# Patient Record
Sex: FEMALE
[De-identification: names, ages, dates, MRNs, and addresses within clinical notes are randomized per-mention and may not be internally consistent; named-entity substitution may affect disease eponyms.]

---

## 2021-06-04 ENCOUNTER — TRANSCRIPTION ENCOUNTER (OUTPATIENT)
Age: 36
End: 2021-06-04

## 2021-09-10 ENCOUNTER — TRANSCRIPTION ENCOUNTER (OUTPATIENT)
Age: 36
End: 2021-09-10

## 2022-01-17 ENCOUNTER — TRANSCRIPTION ENCOUNTER (OUTPATIENT)
Age: 37
End: 2022-01-17

## 2022-03-10 ENCOUNTER — TRANSCRIPTION ENCOUNTER (OUTPATIENT)
Age: 37
End: 2022-03-10

## 2022-08-15 ENCOUNTER — NON-APPOINTMENT (OUTPATIENT)
Age: 37
End: 2022-08-15

## 2022-09-18 ENCOUNTER — NON-APPOINTMENT (OUTPATIENT)
Age: 37
End: 2022-09-18

## 2023-03-07 ENCOUNTER — FORM ENCOUNTER (OUTPATIENT)
Age: 38
End: 2023-03-07

## 2023-04-25 ENCOUNTER — NON-APPOINTMENT (OUTPATIENT)
Age: 38
End: 2023-04-25

## 2023-04-25 PROBLEM — Z00.00 ENCOUNTER FOR PREVENTIVE HEALTH EXAMINATION: Status: ACTIVE | Noted: 2023-04-25

## 2023-05-10 ENCOUNTER — APPOINTMENT (OUTPATIENT)
Dept: BREAST CENTER | Facility: CLINIC | Age: 38
End: 2023-05-10
Payer: MEDICAID

## 2023-05-10 VITALS
WEIGHT: 155 LBS | HEART RATE: 55 BPM | SYSTOLIC BLOOD PRESSURE: 98 MMHG | BODY MASS INDEX: 27.46 KG/M2 | OXYGEN SATURATION: 99 % | HEIGHT: 63 IN | DIASTOLIC BLOOD PRESSURE: 63 MMHG

## 2023-05-10 DIAGNOSIS — J45.909 UNSPECIFIED ASTHMA, UNCOMPLICATED: ICD-10-CM

## 2023-05-10 DIAGNOSIS — Z78.9 OTHER SPECIFIED HEALTH STATUS: ICD-10-CM

## 2023-05-10 DIAGNOSIS — F41.9 ANXIETY DISORDER, UNSPECIFIED: ICD-10-CM

## 2023-05-10 DIAGNOSIS — Z22.7 LATENT TUBERCULOSIS: ICD-10-CM

## 2023-05-10 PROCEDURE — 99204 OFFICE O/P NEW MOD 45 MIN: CPT

## 2023-05-10 RX ORDER — CELECOXIB 50 MG/1
CAPSULE ORAL
Refills: 0 | Status: ACTIVE | COMMUNITY

## 2023-05-10 RX ORDER — ISONIAZID 300 MG/1
TABLET ORAL
Refills: 0 | Status: ACTIVE | COMMUNITY

## 2023-05-10 RX ORDER — MOMETASONE FUROATE 50 UG/1
AEROSOL RESPIRATORY (INHALATION)
Refills: 0 | Status: ACTIVE | COMMUNITY

## 2023-05-10 RX ORDER — BUSPIRONE HYDROCHLORIDE 7.5 MG/1
TABLET ORAL
Refills: 0 | Status: ACTIVE | COMMUNITY

## 2023-05-10 NOTE — PHYSICAL EXAM
[No Supraclavicular Adenopathy] : no supraclavicular adenopathy [Asymmetrical] : asymmetrical [No dominant masses] : no dominant masses in right breast  [No dominant masses] : no dominant masses left breast [No Nipple Retraction] : no left nipple retraction [No Nipple Discharge] : no left nipple discharge [No Axillary Lymphadenopathy] : no left axillary lymphadenopathy [No Rashes] : no rashes [No Ulceration] : no ulceration [Supple] : supple [Examined in the supine and seated position] : examined in the supine and seated position

## 2023-05-12 NOTE — PAST MEDICAL HISTORY
[Menarche Age ____] : age at menarche was [unfilled] [Definite ___ (Date)] : the last menstrual period was [unfilled] [Regular Cycle Intervals] : have been regular [Total Preg ___] : G[unfilled] [History of Hormone Replacement Treatment] : has no history of hormone replacement treatment [FreeTextEntry6] : yes - 2 egg retrieval [FreeTextEntry7] : yes [FreeTextEntry8] : n/a

## 2023-05-12 NOTE — CONSULT LETTER
[Dear  ___] : Dear ~HOLLY, [Consult Letter:] : I had the pleasure of evaluating your patient, [unfilled]. [Please see my note below.] : Please see my note below. [Consult Closing:] : Thank you very much for allowing me to participate in the care of this patient.  If you have any questions, please do not hesitate to contact me. [Sincerely,] : Sincerely, [FreeTextEntry2] : Dr. Lozada [FreeTextEntry3] : Jhony\par \par Jhony De La O MD\par Chief of Breast Surgery\par Director of Breast Cancer Program\par [facility]\par \par \par

## 2023-05-12 NOTE — HISTORY OF PRESENT ILLNESS
[FreeTextEntry1] : 39 yo AJ F referred by Dr. Lozada presents for consultation of BIRADS 3 imaging. Patient most recently had callback R dxMMG/US on 3/8/23 showing probably benign right breast asymmetry seen on the baseline MLO and lateral projections without a sonographic correlate. Six-month follow-up diagnostic right mammography recommended. Denies any palpable abnormalities, skin changes, nipple changes, or nipple discharge bilaterally. Denies any history of breast surgeries or biopsies.\par \par Patient [place] age 37. Denies famhx of ovarian cancer. \par NORMA 16.4%

## 2023-05-12 NOTE — ASSESSMENT
[FreeTextEntry1] : 39 yo F referred by Dr. Lozada presents for consultation of BIRADS 3 imaging. Patient most recently had callback R dxMMG/US on 3/8/23 showing probably benign right breast asymmetry seen on the baseline MLO and lateral projections without a sonographic correlate. \par \par Patient without complaint. Physical exam WNL. Plan for diagnostic right mammography and re-examination in Sept 2023. Patient verbalizes understanding and is in agreement with the plan.\par

## 2023-05-12 NOTE — DATA REVIEWED
[FreeTextEntry1] : 2/3/23 B/L sMMG ([facility]): An asymmetry upper far posterior right breast on MLO view only. Recommend diagnostic right mammogram with 90 degree lateral view, XCCL view and spot compression, attention to the far posterior deep tissue, as well as ultrasound correlation. No significant finding in the left breast. At the time of additional imaging, bilateral whole breast ultrasound recommended due to the density of the breasts. BIRADS 0. \par \par 3/8/23 R dxMMG/US ([facility]): Probably benign finding: Probably benign right breast asymmetry seen on the baseline MLO and lateral projections without a sonographic correlate. Six-month follow-up diagnostic right mammography is recommended. BIRADS 3.

## 2023-07-01 ENCOUNTER — NON-APPOINTMENT (OUTPATIENT)
Age: 38
End: 2023-07-01

## 2023-08-31 NOTE — PHYSICAL EXAM
[Supple] : supple [No Supraclavicular Adenopathy] : no supraclavicular adenopathy [Examined in the supine and seated position] : examined in the supine and seated position [Asymmetrical] : asymmetrical [No dominant masses] : no dominant masses in right breast  [No dominant masses] : no dominant masses left breast [No Nipple Retraction] : no left nipple retraction [No Nipple Discharge] : no left nipple discharge [No Axillary Lymphadenopathy] : no left axillary lymphadenopathy [No Rashes] : no rashes [No Ulceration] : no ulceration

## 2023-09-05 ENCOUNTER — APPOINTMENT (OUTPATIENT)
Dept: BREAST CENTER | Facility: CLINIC | Age: 38
End: 2023-09-05
Payer: MEDICAID

## 2023-09-05 VITALS — HEART RATE: 49 BPM | SYSTOLIC BLOOD PRESSURE: 114 MMHG | HEIGHT: 63 IN | DIASTOLIC BLOOD PRESSURE: 75 MMHG

## 2023-09-05 PROCEDURE — 99213 OFFICE O/P EST LOW 20 MIN: CPT

## 2023-09-06 NOTE — HISTORY OF PRESENT ILLNESS
[FreeTextEntry1] : 37 yo AJ F presents for 6 month follow up. Previously seen for consultation of BIRADS 3 imaging. Patient most recently had callback R dxMMG/US on 3/8/23 showing probably benign right breast asymmetry seen on the baseline MLO and lateral projections without a sonographic correlate.  Follow up Dx R MG & US today (9/5/23) BIRADS-2 shows stability of right breast asymmetry. Denies any palpable abnormalities, skin changes, nipple changes, or nipple discharge bilaterally. Denies any history of breast surgeries or biopsies.  [facility] Denies fam[facility]x of ovarian cancer.  NORMA 16.4%

## 2023-09-06 NOTE — DATA REVIEWED
[FreeTextEntry1] : 2/3/23 B/L sMMG ([facility]): An asymmetry upper far posterior right breast on MLO view only. Recommend diagnostic right mammogram with 90 degree lateral view, XCCL view and spot compression, attention to the far posterior deep tissue, as well as ultrasound correlation. No significant finding in the left breast. At the time of additional imaging, bilateral whole breast ultrasound recommended due to the density of the breasts. BIRADS 0.   3/8/23 R dxMMG/US ([facility]): Probably benign finding: Probably benign right breast asymmetry seen on the baseline MLO and lateral projections without a sonographic correlate. Six-month follow-up diagnostic right mammography is recommended. BIRADS 3.  9/5/23 Dx R MG & US ([facility])- heterogeneously dense. Asymmetry overlying the right MLO projection has remained stable. Benign finding: Stable right breast mammogram. FOLLOW-UP: bilateral mammography in February 2024 is recommended. BI-RADS 2:  Benign.

## 2023-09-06 NOTE — ASSESSMENT
[FreeTextEntry1] : 37 yo AJ F presents for 6 month follow up. Previously seen for consultation of BIRADS 3 imaging. Patient most recently had callback R dxMMG/US on 3/8/23 showing probably benign right breast asymmetry seen on the baseline MLO and lateral projections without a sonographic correlate.  Follow up Dx R MG & US today (9/5/23) BIRADS-2 shows stability of right breast asymmetry.  Patient without complaint. Physical exam WNL. Plan for B/L sMMG & US in Feb 2024  and re-examination. Patient verbalizes understanding and is in agreement with the plan.

## 2023-09-06 NOTE — PAST MEDICAL HISTORY
[Menarche Age ____] : age at menarche was [unfilled] [Regular Cycle Intervals] : have been regular [Total Preg ___] : G[unfilled] [Definite ___ (Date)] : the last menstrual period was [unfilled] [History of Hormone Replacement Treatment] : has no history of hormone replacement treatment [FreeTextEntry6] : yes - 2 egg retrieval [FreeTextEntry7] : yes [FreeTextEntry8] : n/a

## 2024-02-06 ENCOUNTER — APPOINTMENT (OUTPATIENT)
Dept: BREAST CENTER | Facility: CLINIC | Age: 39
End: 2024-02-06

## 2024-02-22 ENCOUNTER — NON-APPOINTMENT (OUTPATIENT)
Age: 39
End: 2024-02-22

## 2024-02-28 ENCOUNTER — NON-APPOINTMENT (OUTPATIENT)
Age: 39
End: 2024-02-28

## 2024-03-01 PROBLEM — R92.8 ABNORMAL FINDING ON BREAST IMAGING: Status: ACTIVE | Noted: 2023-05-08

## 2024-03-01 PROBLEM — R92.30 DENSE BREAST TISSUE: Status: ACTIVE | Noted: 2024-03-01

## 2024-03-01 PROBLEM — Z80.3 FAMILY HISTORY OF MALIGNANT NEOPLASM OF BREAST: Status: ACTIVE | Noted: 2023-05-10

## 2024-03-01 NOTE — ASSESSMENT
[FreeTextEntry1] : 39 yo AJ F presents for 6 month follow up. Previously seen for consultation of BIRADS 3 imaging. Patient most recently had callback R dxMMG/US on 3/8/23 showing probably benign right breast asymmetry seen on the baseline MLO and lateral projections without a sonographic correlate.  Follow up Dx R MG & US (9/5/23) BIRADS-2 shows stability of right breast asymmetry.  NORMA 16.4%. Patient without complaints. Physical exam WNL. Most recent imaging reviewed, B/L sMMG/US (3/5/24) BI-RADS ???? Plan for B/L sMMG/US and re-examination in March 2025. ?? Patient verbalizes understanding and is in agreement with the plan.

## 2024-03-01 NOTE — DATA REVIEWED
[FreeTextEntry1] : 2/3/23 B/L sMMG ([facility]): An asymmetry upper far posterior right breast on MLO view only. Recommend diagnostic right mammogram with 90 degree lateral view, XCCL view and spot compression, attention to the far posterior deep tissue, as well as ultrasound correlation. No significant finding in the left breast. At the time of additional imaging, bilateral whole breast ultrasound recommended due to the density of the breasts. BIRADS 0.   3/8/23 R dxMMG/US ([facility]): Probably benign finding: Probably benign right breast asymmetry seen on the baseline MLO and lateral projections without a sonographic correlate. Six-month follow-up diagnostic right mammography is recommended. BIRADS 3.  9/5/23 Dx R MG & US ([facility])- heterogeneously dense. Asymmetry overlying the right MLO projection has remained stable. Benign finding: Stable right breast mammogram. FOLLOW-UP: bilateral mammography in February 2024 is recommended. BI-RADS 2:  Benign.   3/5/24 B/L sMMG/US ([facility]): scheduled ...

## 2024-03-01 NOTE — PAST MEDICAL HISTORY
[Menarche Age ____] : age at menarche was [unfilled] [History of Hormone Replacement Treatment] : has no history of hormone replacement treatment [Definite ___ (Date)] : the last menstrual period was [unfilled] [Regular Cycle Intervals] : have been regular [Total Preg ___] : G[unfilled] [FreeTextEntry6] : yes - 2 egg retrieval [FreeTextEntry7] : yes [FreeTextEntry8] : n/a

## 2024-03-01 NOTE — HISTORY OF PRESENT ILLNESS
[FreeTextEntry1] : 37 yo AJ Female presents for follow up evaluation, previously seen for consultation of BIRADS 3 imaging. Patient most recently had callback R dxMMG/US on 3/8/23 showing probably benign right breast asymmetry seen on the baseline MLO and lateral projections without a sonographic correlate. Follow up Dx R MG & US (9/5/23) BIRADS-2 shows stability of right breast asymmetry. Denies any palpable abnormalities, skin changes, nipple changes, or nipple discharge bilaterally. Denies any history of breast surgeries or biopsies.  B/L sMMG/US (3/5/24), BI-RADS ???  Patient [place] age 37. Denies famhx of ovarian cancer.  NORMA 16.4%

## 2024-03-04 ENCOUNTER — NON-APPOINTMENT (OUTPATIENT)
Age: 39
End: 2024-03-04

## 2024-03-05 ENCOUNTER — APPOINTMENT (OUTPATIENT)
Dept: BREAST CENTER | Facility: CLINIC | Age: 39
End: 2024-03-05

## 2024-03-05 DIAGNOSIS — R92.8 OTHER ABNORMAL AND INCONCLUSIVE FINDINGS ON DIAGNOSTIC IMAGING OF BREAST: ICD-10-CM

## 2024-03-05 DIAGNOSIS — R92.30 DENSE BREASTS, UNSPECIFIED: ICD-10-CM

## 2024-03-05 DIAGNOSIS — Z80.3 FAMILY HISTORY OF MALIGNANT NEOPLASM OF BREAST: ICD-10-CM

## 2025-01-06 ENCOUNTER — NON-APPOINTMENT (OUTPATIENT)
Age: 40
End: 2025-01-06

## 2025-01-07 ENCOUNTER — APPOINTMENT (OUTPATIENT)
Dept: OPHTHALMOLOGY | Facility: CLINIC | Age: 40
End: 2025-01-07

## 2025-01-07 ENCOUNTER — NON-APPOINTMENT (OUTPATIENT)
Age: 40
End: 2025-01-07

## 2025-01-28 ENCOUNTER — TRANSCRIPTION ENCOUNTER (OUTPATIENT)
Age: 40
End: 2025-01-28

## 2025-03-04 ENCOUNTER — APPOINTMENT (OUTPATIENT)
Dept: OPHTHALMOLOGY | Facility: CLINIC | Age: 40
End: 2025-03-04